# Patient Record
Sex: FEMALE | Race: WHITE | NOT HISPANIC OR LATINO | URBAN - METROPOLITAN AREA
[De-identification: names, ages, dates, MRNs, and addresses within clinical notes are randomized per-mention and may not be internally consistent; named-entity substitution may affect disease eponyms.]

---

## 2019-02-28 ENCOUNTER — HISTORICAL (OUTPATIENT)
Dept: ADMINISTRATIVE | Facility: HOSPITAL | Age: 69
End: 2019-02-28

## 2019-03-25 ENCOUNTER — HISTORICAL (OUTPATIENT)
Dept: ADMINISTRATIVE | Facility: HOSPITAL | Age: 69
End: 2019-03-25

## 2021-03-24 ENCOUNTER — HISTORICAL (OUTPATIENT)
Dept: ADMINISTRATIVE | Facility: HOSPITAL | Age: 71
End: 2021-03-24

## 2021-03-24 LAB
ABS NEUT (OLG): 3.42 X10(3)/MCL (ref 2.1–9.2)
BASOPHILS # BLD AUTO: 0 X10(3)/MCL (ref 0–0.2)
BASOPHILS NFR BLD AUTO: 0.5 %
EOSINOPHIL # BLD AUTO: 0.3 X10(3)/MCL (ref 0–0.9)
EOSINOPHIL NFR BLD AUTO: 4.7 %
ERYTHROCYTE [DISTWIDTH] IN BLOOD BY AUTOMATED COUNT: 13 % (ref 11.5–17)
ERYTHROCYTE [SEDIMENTATION RATE] IN BLOOD: 7 MM/HR (ref 0–20)
FERRITIN SERPL-MCNC: 40.54 NG/ML (ref 4.63–204)
HCT VFR BLD AUTO: 41 % (ref 37–47)
HGB BLD-MCNC: 13.1 GM/DL (ref 12–16)
IRON SATN MFR SERPL: 19 % (ref 20–50)
IRON SERPL-MCNC: 85 UG/DL (ref 50–170)
LYMPHOCYTES # BLD AUTO: 1.8 X10(3)/MCL (ref 0.6–4.6)
LYMPHOCYTES NFR BLD AUTO: 30.8 %
MCH RBC QN AUTO: 29 PG (ref 27–31)
MCHC RBC AUTO-ENTMCNC: 32 GM/DL (ref 33–36)
MCV RBC AUTO: 90.7 FL (ref 80–94)
MONOCYTES # BLD AUTO: 0.4 X10(3)/MCL (ref 0.1–1.3)
MONOCYTES NFR BLD AUTO: 6.4 %
NEUTROPHILS # BLD AUTO: 3.4 X10(3)/MCL (ref 2.1–9.2)
NEUTROPHILS NFR BLD AUTO: 57.4 %
PLATELET # BLD AUTO: 388 X10(3)/MCL (ref 130–400)
PMV BLD AUTO: 8.5 FL (ref 9.4–12.4)
RBC # BLD AUTO: 4.52 X10(6)/MCL (ref 4.2–5.4)
TIBC SERPL-MCNC: 373 UG/DL (ref 70–310)
TIBC SERPL-MCNC: 458 UG/DL (ref 250–450)
TRANSFERRIN SERPL-MCNC: 402 MG/DL (ref 173–360)
WBC # SPEC AUTO: 6 X10(3)/MCL (ref 4.5–11.5)

## 2021-06-23 ENCOUNTER — HISTORICAL (OUTPATIENT)
Dept: ADMINISTRATIVE | Facility: HOSPITAL | Age: 71
End: 2021-06-23

## 2021-06-23 LAB
ABS NEUT (OLG): 1.67 X10(3)/MCL (ref 2.1–9.2)
BASOPHILS # BLD AUTO: 0 X10(3)/MCL (ref 0–0.2)
BASOPHILS NFR BLD AUTO: 0.8 %
EOSINOPHIL # BLD AUTO: 0.2 X10(3)/MCL (ref 0–0.9)
EOSINOPHIL NFR BLD AUTO: 4.5 %
ERYTHROCYTE [DISTWIDTH] IN BLOOD BY AUTOMATED COUNT: 12.4 % (ref 11.5–17)
FERRITIN SERPL-MCNC: 54.31 NG/ML (ref 4.63–204)
HCT VFR BLD AUTO: 41.4 % (ref 37–47)
HGB BLD-MCNC: 13.3 GM/DL (ref 12–16)
IRON SATN MFR SERPL: 17 % (ref 20–50)
IRON SERPL-MCNC: 73 UG/DL (ref 50–170)
LYMPHOCYTES # BLD AUTO: 1.4 X10(3)/MCL (ref 0.6–4.6)
LYMPHOCYTES NFR BLD AUTO: 40.5 %
MCH RBC QN AUTO: 29 PG (ref 27–31)
MCHC RBC AUTO-ENTMCNC: 32.1 GM/DL (ref 33–36)
MCV RBC AUTO: 90.2 FL (ref 80–94)
MONOCYTES # BLD AUTO: 0.3 X10(3)/MCL (ref 0.1–1.3)
MONOCYTES NFR BLD AUTO: 7.5 %
NEUTROPHILS # BLD AUTO: 1.7 X10(3)/MCL (ref 2.1–9.2)
NEUTROPHILS NFR BLD AUTO: 46.7 %
PLATELET # BLD AUTO: 357 X10(3)/MCL (ref 130–400)
PMV BLD AUTO: 8.5 FL (ref 9.4–12.4)
RBC # BLD AUTO: 4.59 X10(6)/MCL (ref 4.2–5.4)
TIBC SERPL-MCNC: 355 UG/DL (ref 70–310)
TIBC SERPL-MCNC: 428 UG/DL (ref 250–450)
TRANSFERRIN SERPL-MCNC: 391 MG/DL (ref 173–360)
WBC # SPEC AUTO: 3.6 X10(3)/MCL (ref 4.5–11.5)

## 2021-12-29 ENCOUNTER — HISTORICAL (OUTPATIENT)
Dept: ADMINISTRATIVE | Facility: HOSPITAL | Age: 71
End: 2021-12-29

## 2021-12-29 LAB
ABS NEUT (OLG): 3.35 X10(3)/MCL (ref 2.1–9.2)
BASOPHILS # BLD AUTO: 0 X10(3)/MCL (ref 0–0.2)
BASOPHILS NFR BLD AUTO: 0.3 %
EOSINOPHIL # BLD AUTO: 0.2 X10(3)/MCL (ref 0–0.9)
EOSINOPHIL NFR BLD AUTO: 2.9 %
ERYTHROCYTE [DISTWIDTH] IN BLOOD BY AUTOMATED COUNT: 12.1 % (ref 11.5–17)
FERRITIN SERPL-MCNC: 75.05 NG/ML (ref 4.63–204)
HCT VFR BLD AUTO: 42.8 % (ref 37–47)
HGB BLD-MCNC: 13.9 GM/DL (ref 12–16)
LYMPHOCYTES # BLD AUTO: 2 X10(3)/MCL (ref 0.6–4.6)
LYMPHOCYTES NFR BLD AUTO: 33.6 %
MCH RBC QN AUTO: 29.5 PG (ref 27–31)
MCHC RBC AUTO-ENTMCNC: 32.5 GM/DL (ref 33–36)
MCV RBC AUTO: 90.9 FL (ref 80–94)
MONOCYTES # BLD AUTO: 0.4 X10(3)/MCL (ref 0.1–1.3)
MONOCYTES NFR BLD AUTO: 6.9 %
NEUTROPHILS # BLD AUTO: 3.4 X10(3)/MCL (ref 2.1–9.2)
NEUTROPHILS NFR BLD AUTO: 56.1 %
PLATELET # BLD AUTO: 390 X10(3)/MCL (ref 130–400)
PMV BLD AUTO: 8.7 FL (ref 9.4–12.4)
RBC # BLD AUTO: 4.71 X10(6)/MCL (ref 4.2–5.4)
WBC # SPEC AUTO: 6 X10(3)/MCL (ref 4.5–11.5)

## 2022-04-30 NOTE — PROGRESS NOTES
Patient:   Chandrika Salinas            MRN: [id number]            FIN: [id number]               Age:   71 years     Sex:  Female     :  1950   Associated Diagnoses:   None   Author:   Trenton Angulo MD      Visit Information   Diagnosis:  Mild iron deficiency anemia                   Thrombocytosis    Current Treatment:  OTC iron 324 mg daily    Clinical History:  Patient was referred by her PCP for evaluation of an elevated platelet count seen on routine laboratory. She was seen for a wellness visit 3/01/21. CBC drawn 21 showed a platelet count of 496,000. WBC was 6.6 with a normal differential, hemoglobin 14.0 and hematocrit 42.3. Previous laboratory showed a similar increased platelet count dating back to . Interestingly, she had microcytic red cell indices  and  with an MCV of 81-82. She was placed on and OTC oral iron supplement once a day in . Her MCV had improved to 86.9 on follow-up testing 21. She has no signs or symptoms of pica. She denied any abnormal bruising or bleeding. No abdominal symptoms or complaints, no melena or hematochezia. She has no history of thromboembolism. She had an EGD with esophageal banding approximately 3 years ago. Her last colonoscopy was 6 years ago with no significant findings. Follow-up exam was recommended in 10 years. She has had no recent illnesses or infections. She underwent a left total knee replacement .    She was seen as a new patient at [facility] 3/24/21. Laboratory testing showed no significant anemia and a normal platelet count of 388,000. Testing for the JAK2 mutation associated with myeloproliferative disorders was negative including exons 12, 13, 14 and 15. Iron profile was compatible with partially corrected iron deficiency. Serum iron 85, TIBC 458, saturation 19% and serum ferritin 40 ng/mL. She was instructed to continue her oral iron supplement with follow-up testing in 3 months.      Chief  Complaint   I feel fine      Interval History   She returns to the clinic today by herself for a 3 month follow-up visit. She remains on an oral iron supplement once a day. She is having no issues with tolerability, no abdominal pain, nausea, vomiting or constipation. Laboratory testing shows no significant anemia. Her platelet count remains normal. She has been vaccinated for COVID-19.      Review of Systems   Constitutional:  No fever, No chills, No sweats, No weakness, No fatigue.    Eye:  No icterus, No blurring, No double vision.    Ear/Nose/Mouth/Throat:  Nasal congestion, Chronic postnasal drip, No sore throat.    Respiratory:  No shortness of breath, No cough, No sputum production, No wheezing.    Cardiovascular:  No chest pain, No palpitations, No tachycardia.    Gastrointestinal:  No nausea, No vomiting, No diarrhea, No constipation, No abdominal pain.    Genitourinary:  No dysuria, No hematuria, No change in urine stream.    Hematology/Lymphatics:  No bruising tendency, No bleeding tendency, No swollen lymph glands.    Musculoskeletal:  Joint pain, No lower extremity edema, No back pain.    Integumentary:  No rash, No pruritus, No skin lesion.    Neurologic:  Alert and oriented X4, No abnormal balance, No confusion, No numbness, No tingling.    Psychiatric:  No anxiety, No depression.       Health Status   Current medications:  (Selected)   Outpatient Medications  Ordered  acetaZOLAMIDE: 500 mg, form: Injection, IV, Once, first dose 02/28/19 9:00:00 CST, stop date 02/28/19 9:00:00 CST  Documented Medications  Documented  Carafate 1 g oral tablet: 1 gm = 1 tab(s), Oral, BID, # 60 tab(s), 0 Refill(s)  Pantoprazole 40 mg ORAL EC-Tablet: 40 mg = 1 tab(s), Oral, Daily, # 30 tab(s), 0 Refill(s)  Vitamin D 1,000 Units Tab: = 1 tab(s), Oral, Daily, # 30 tab(s), 0 Refill(s)  amLODIPine 10 mg oral tablet: 10 mg = 1 tab(s), Oral, Daily  aspirin 81 mg oral Delayed Release (EC) tablet: 81 mg = 1 tab(s), Oral, Daily, #  30 tab(s), 0 Refill(s)  atorvastatin 10 mg oral tablet: 10 mg = 1 tab(s), Oral, Daily  fenofibrate 134 mg oral capsule: 134 mg = 1 cap(s), Oral, Daily  ferrous fumarate 324 mg oral tablet: 324 mg = 1 tab(s), Oral, Daily  levothyroxine 112 mcg (0.112 mg) oral tablet: 112 mcg = 1 tab(s), Oral, Daily  pramipexole 1.5 mg oral tablet: 1.5 mg = 1 tab(s), Oral, qPM  traMADol 50 mg oral tablet: 50 mg, 1-2 tab(s), Oral, q6hr  traZODone 100 mg oral tablet: 100 mg, 1-2 tab(s), Oral, qPM  venlafaxine 150 mg oral capsule, extended release: 150 mg = 1 cap(s), Oral, Daily      Histories     PMHx:  HTN, hypothyroidism, GERD, RLS    PSHx:  Thyroid nodule, rhinoplasty, cataracts, left total knee replacement, esophageal banding, right carpal tunnel release    SH:  Lifetime nonsmoker, occasional social alcohol use. Lives alone in [place]. Retired  at .    FH:  Her sister had breast cancer. No family history of thromboembolism.      Physical Examination   Vital Signs   6/23/2021 13:15 CDT      Temperature Oral          36.7 DegC                             Temperature Oral (calculated)             98.06 DegF                             Peripheral Pulse Rate     87 bpm                             Systolic Blood Pressure   148 mmHg  HI                             Diastolic Blood Pressure  86 mmHg                             Blood Pressure Location   Right arm                             Manual Cuff BP            No     General:  Alert and oriented, Well-developed, elderly white female in no acute distress.    Eye:  Pupils are equal, round and reactive to light, Extraocular movements are intact, Normal conjunctiva.    HENT:  Normocephalic, Oral mucosa is moist, No pharyngeal erythema.    Neck:  Supple, Non-tender, No lymphadenopathy.    Respiratory:  Lungs clear to auscultation throughout.    Cardiovascular:  Normal rate, Regular rhythm, No murmur.    Gastrointestinal:  Soft, Non-tender, Non-distended, Normal  bowel sounds, No palpable masses or organomegaly.    Lymphatics:  No lymphadenopathy neck, axilla, groin.    Musculoskeletal:  Normal range of motion, No tenderness, No lower extremity edema.    Integumentary:  Warm, Dry, No rash.    Neurologic:  Alert, Oriented, Normal sensory, Normal motor function, No focal deficits, Cranial Nerves II-XII are grossly intact.       Review / Management   Laboratory Results   Today's Lab Results : PowerNote Discrete Results   6/23/2021 12:39 CDT      WBC                       3.6 x10(3)/mcL  LOW                             RBC                       4.59 x10(6)/mcL                             Hgb                       13.3 gm/dL                             Hct                       41.4 %                             Platelet                  357 x10(3)/mcL                             MCV                       90.2 fL                             MCH                       29.0 pg                             MCHC                      32.1 gm/dL  LOW                             RDW                       12.4 %                             MPV                       8.5 fL  LOW                             Abs Neut                  1.67 x10(3)/mcL  LOW                             NEUT%                     46.7 %  NA                             NEUT#                     1.7 x10(3)/mcL  LOW                             LYMPH%                    40.5 %  NA                             LYMPH#                    1.4 x10(3)/mcL                             MONO%                     7.5 %  NA                             MONO#                     0.3 x10(3)/mcL                             EOS%                      4.5 %  NA                             EOS#                      0.2 x10(3)/mcL                             BASO%                     0.8 %  NA                             BASO#                     0.0 x10(3)/mcL                          12/30/19    6/26/20    2/22/21   3/24/21   6/23/21  WBC           5.2             4.1           6.6          6.0          3.6  Hgb          12.6            12.7         14.0        13.1         13.3  MCV        82.6            81.5         86.9        90.7        41.4  Platelets     457             444          496         388         357      Impression and Plan   IMPRESSION:  Mild iron deficiency anemia  Reactive thrombocytosis secondary to above    PLAN:  Patient's thrombocytosis has resolved since she was started on iron supplementation. She has no significant anemia.  Serum ferritin level drawn today is pending.  RTC in 6 months for a follow-up visit with repeat laboratory.      KAREN MONTESINOS M.D.    Other Physicians  Dr. Mary Hancock

## 2022-04-30 NOTE — PROGRESS NOTES
Patient:   Chandrika Salinas            MRN: [id number]            FIN: [id number]               Age:   70 years     Sex:  Female     :  1950   Associated Diagnoses:   None   Author:   Trenton Angulo MD      Visit Information   Diagnosis: Thrombocytosis    Current Treatment: New patient visit    Clinical History:  Patient referred by her PCP for evaluation of an elevated platelet count seen on routine laboratory. She was seen for a wellness visit 3/01/21. CBC drawn 21 showed a platelet count of 496,000. WBC was 6.6 with a normal differential, hemoglobin 14.0 and hematocrit 42.3. Previous laboratory showed a similar increased platelet count dating back to . Interestingly, she had microcytic red cell indices  and  with an MCV of 81-82. She was placed on and OTC oral iron supplement once a day in . Her MCV had improved to 86.9 on follow-up testing 21. She has no signs or symptoms of pica. She denies any abnormal bruising or bleeding. No abdominal symptoms or complaints, no melena or hematochezia. She has no history of thromboembolism. She had an EGD with esophageal banding approximately 3 years ago. Her last colonoscopy was 6 years ago with no significant findings. Follow-up exam was recommended in 10 years. She has had no recent illnesses or infections. She underwent a left total knee replacement .      Chief Complaint   Abnormal blood work      Review of Systems   Constitutional:  No fever, No chills, No sweats, No weakness, No fatigue.    Eye:  No icterus, No blurring, No double vision.    Ear/Nose/Mouth/Throat:  Nasal congestion, Chronic postnasal drip, No sore throat.    Respiratory:  No shortness of breath, No cough, No sputum production, No hemoptysis.    Cardiovascular:  No chest pain, No palpitations, No tachycardia.    Gastrointestinal:  No nausea, No vomiting, No diarrhea, No constipation, No abdominal pain.    Genitourinary:  No dysuria, No hematuria, No  change in urine stream.    Hematology/Lymphatics:  No bruising tendency, No bleeding tendency, No swollen lymph glands.    Musculoskeletal:  Joint pain, No lower extremity edema, No back pain.    Integumentary:  No rash, No pruritus, No skin lesion.    Neurologic:  Alert and oriented X4, No abnormal balance, No confusion, No numbness, No tingling.    Psychiatric:  No anxiety, No depression.       Health Status   Current medications:  (Selected)   Documented Medications  Documented  Carafate 1 g oral tablet: 1 gm = 1 tab(s), Oral, BID, # 60 tab(s), 0 Refill(s)  Pantoprazole 40 mg ORAL EC-Tablet: 40 mg = 1 tab(s), Oral, Daily, # 30 tab(s), 0 Refill(s)  Vitamin D 1,000 Units Tab: = 1 tab(s), Oral, Daily, # 30 tab(s), 0 Refill(s)  amLODIPine 10 mg oral tablet: 10 mg = 1 tab(s), Oral, Daily  aspirin 81 mg oral Delayed Release (EC) tablet: 81 mg = 1 tab(s), Oral, Daily, # 30 tab(s), 0 Refill(s)  atorvastatin 10 mg oral tablet: 10 mg = 1 tab(s), Oral, Daily  fenofibrate 134 mg oral capsule: 134 mg = 1 cap(s), Oral, Daily  ferrous fumarate 324 mg oral tablet: 324 mg = 1 tab(s), Oral, Daily  levothyroxine 100 mcg (0.1 mg) oral tablet: 100 mcg = 1 tab(s), Oral, Daily  pramipexole 1 mg oral tablet: 1 mg = 1 tab(s), Oral, TID  pramipexole 1.5 mg oral tablet: 1.5 mg = 1 tab(s), Oral, qPM  traMADol 50 mg oral tablet: 50 mg, 1-2 tab(s), Oral, q6hr  traZODone 100 mg oral tablet: 100 mg, 1-2 tab(s), Oral, qPM  venlafaxine 150 mg oral capsule, extended release: 150 mg = 1 cap(s), Oral, Daily      Histories     PMHx:  HTN, hypothyroidism, GERD, RLS    PSHx:  Thyroid nodule, rhinoplasty, cataracts, left total knee replacement, esophageal banding, right carpal tunnel release    SH:  Lifetime nonsmoker, occasional social alcohol use. Lives alone in [place]. Retired  at .    FH:  Her sister had breast cancer. No family history of thromboembolism.      Physical Examination   Vital Signs   3/24/2021 13:32 CDT       Temperature Oral          36.5 DegC                             Temperature Oral (calculated)             97.70 DegF                             Peripheral Pulse Rate     82 bpm                             Systolic Blood Pressure   116 mmHg                             Diastolic Blood Pressure  77 mmHg                             Blood Pressure Location   Left arm                             Manual Cuff BP            No     General:  Alert and oriented, Well-developed, elderly white female in no acute distress.    Eye:  Pupils are equal, round and reactive to light, Extraocular movements are intact, Normal conjunctiva.    HENT:  Normocephalic, Oral mucosa is moist, No pharyngeal erythema.    Neck:  Supple, Non-tender, No lymphadenopathy.    Respiratory:  Lungs are clear to auscultation, Respirations are non-labored, Breath sounds are equal.    Cardiovascular:  Normal rate, Regular rhythm, No murmur.    Gastrointestinal:  Soft, Non-tender, Non-distended, Normal bowel sounds, No palpable masses or splenomegaly.    Lymphatics:  No lymphadenopathy neck, axilla, groin.    Musculoskeletal:  Normal range of motion, No tenderness, No lower extremity edema.    Integumentary:  Warm, Dry, No rash.    Neurologic:  Alert, Oriented, Normal sensory, Normal motor function, No focal deficits, Cranial Nerves II-XII are grossly intact.       Review / Management   Laboratory Results   Today's Lab Results : PowerNote Discrete Results   3/24/2021 13:55 CDT      WBC                       6.0 x10(3)/mcL                             RBC                       4.52 x10(6)/mcL                             Hgb                       13.1 gm/dL                             Hct                       41.0 %                             Platelet                  388 x10(3)/mcL                             MCV                       90.7 fL                             MCH                       29.0 pg                             MCHC                       32.0 gm/dL  LOW                             RDW                       13.0 %                             MPV                       8.5 fL  LOW                             Abs Neut                  3.42 x10(3)/mcL                             NEUT%                     57.4 %  NA                             NEUT#                     3.4 x10(3)/mcL                             LYMPH%                    30.8 %  NA                             LYMPH#                    1.8 x10(3)/mcL                             MONO%                     6.4 %  NA                             MONO#                     0.4 x10(3)/mcL                             EOS%                      4.7 %  NA                             EOS#                      0.3 x10(3)/mcL                             BASO%                     0.5 %  NA                             BASO#                     0.0 x10(3)/mcL                          12/30/19    6/26/20    2/22/21   3/24/21  WBC          5.2             4.1           6.6          6.0    Hgb          12.6            12.7         14.0        13.1  MCV        82.6            81.5         86.9        90.7  Platelets     457             444          496         388      Impression and Plan   IMPRESSION:  Mild thrombocytosis - improved    PLAN:  Previous laboratory testing showed mild thrombocytosis with mild anemia and microcytic RBC indices suggestive of underlying iron deficiency.  Her platelet count shows improvement today and her MCV has gradually increased since starting an oral iron supplement.  Will check an iron profile and serum ferritin level today. I will notify her of the results when available.  Additional blood drawn for a sedimentation rate and JAK2 mutation (if clinically indicated).  I will notify her of the results when available and discuss if additional follow-up is indicated.      KAREN MONTESINOS M.D.    Other Physicians  Dr. Mary Hancock      Professional Services   I, Anat Yoo LPN,  acted solely as a scribe for and in the presence of, Dr. Trenton Angulo, who performed the service.

## 2022-04-30 NOTE — OP NOTE
* Final Report *  Right cataract op note regular     Patient:   Chandrika Salinas            MRN: [id number]            FIN: [id number]               Age:   68 years     Sex:  Female     :  1950   Associated Diagnoses:   None   Author:   Chago Licea II, MD      Pre-op Dx:  Cataract of the Right eye    Post-op Dx:  Cataract of the Right eye     Procedure:  Cataract extraction by phacoemulsification   with an IOL    Anes:   Topical    Complications: None    Procedure in detail:   Dilating drops were given in the holding area.  The patient was brought into the surgical suite, identified and the correct eye confirmed.  Topical anesthesia was applied.  The eye was then prepped and draped in a sterile fashion.  A supersharp blade was used to make a paracentesis at the 11 oclock position.  Viscoelastic was placed in the anterior chamber.  A clear corneal incision was made at the 202degree position with a keratome blade.  Next, a cystotome and utrata forceps were used to make a 360 degree capsulorrhexis.  The phaco handpiece was placed into the anterior chamber and the lens removed in a divide and conquer fashion.  The remaining cortex was removed with the I & A handpiece.  Viscoelastic was placed into the capsular bag, which remained clear and intact.  An  IOL was placed in the bag and rotated into position.  The remaining viscoelastic was removed with the I &A handpiece.  The incision was hydrated with BSS and checked for leakage.  No leakage was found.  The drapes were removed and antibiotic drops were placed into the eye.  The patient tolerated the procedure well and was moved back to the holding room.  Sunglasses and instructions were personally given to the patient and family.  The patient will follow-up at my office tomorrow.     EFX 49    26.0    ZCBOO iol        Al Licea II, M.D.        Result type: Consultation Note  Result date: 2019 9:25 CST  Result status: Auth  (Verified)  Result title: Right cataract op note regular  Performed by: Chago Licea II, MD on February 28, 2019 9:25 CST  Verified by: Chago Licea II, MD on February 28, 2019 9:25 CST  Encounter info: [id number], [facility] [place], Day Surgery, 2/28/2019 - 2/28/2019  moved by him

## 2022-04-30 NOTE — OP NOTE
Patient:   Chandrika Salinas            MRN: [id number]            FIN: [id number]               Age:   68 years     Sex:  Female     :  1950   Associated Diagnoses:   None   Author:   Chago Licea II, MD      Pre-op Dx:  Cataract of the Left eye    Post-op Dx:  Cataract of the Left eye     Procedure:  Cataract extraction by phacoemulsification   with an IOL    Anes:   Topical    Complications: None    Procedure in detail:   Dilating drops were given in the holding area.  The patient was brought into the surgical suite, identified and the correct eye confirmed.  Topical anesthesia was applied.  The eye was then prepped and draped in a sterile fashion.  A supersharp blade was used to make a paracentesis at the 5 oclock position.  Viscoelastic was placed in the anterior chamber.  A clear corneal incision was made at the   3 oclock position with a keratome blade.  Next, a cystatome and utrata forceps were used to make and remove a 360 degree capsulorrhexis.  The phaco handpiece was placed into the anterior chamber and the lens removed in a divide and conquer fashion.  The remaining cortex was removed with the I & A handpiece.  Viscoelastic was placed into the capsular bag, which remained clear and intact.  An  IOL was placed in the bag and rotated into position.  The remaining viscoelastic was removed with the I &A handpiece.  The incision was hydrated with BSS and checked for leakage.  No leakage was found.  The drapes were removed and antibiotic drops were placed into the eye.  The patient tolerated the procedure well and was moved back to the holding room.  Sunglasses and instructions were personally given to the patient and family.  The patient will follow-up at my office tomorrow.      EFX 56    22.0   ZCBOO IOL        Al Licea II, M.D.